# Patient Record
Sex: FEMALE | Race: BLACK OR AFRICAN AMERICAN | NOT HISPANIC OR LATINO | Employment: FULL TIME | RURAL
[De-identification: names, ages, dates, MRNs, and addresses within clinical notes are randomized per-mention and may not be internally consistent; named-entity substitution may affect disease eponyms.]

---

## 2018-10-08 ENCOUNTER — APPOINTMENT (OUTPATIENT)
Dept: LAB | Facility: HOSPITAL | Age: 38
End: 2018-10-08

## 2018-10-08 ENCOUNTER — OFFICE VISIT (OUTPATIENT)
Dept: OBSTETRICS AND GYNECOLOGY | Facility: CLINIC | Age: 38
End: 2018-10-08

## 2018-10-08 VITALS
SYSTOLIC BLOOD PRESSURE: 153 MMHG | HEIGHT: 65 IN | DIASTOLIC BLOOD PRESSURE: 105 MMHG | BODY MASS INDEX: 25.83 KG/M2 | WEIGHT: 155 LBS | HEART RATE: 104 BPM

## 2018-10-08 DIAGNOSIS — R03.0 DIASTOLIC BLOOD PRESSURE 90 MM HG OR HIGHER: ICD-10-CM

## 2018-10-08 DIAGNOSIS — Z11.3 SCREEN FOR SEXUALLY TRANSMITTED DISEASES: ICD-10-CM

## 2018-10-08 DIAGNOSIS — N94.6 DYSMENORRHEA: Primary | ICD-10-CM

## 2018-10-08 DIAGNOSIS — R00.0 TACHYCARDIA WITH HEART RATE 100-120 BEATS PER MINUTE: ICD-10-CM

## 2018-10-08 DIAGNOSIS — Z12.4 SCREENING FOR CERVICAL CANCER: ICD-10-CM

## 2018-10-08 PROCEDURE — 87491 CHLMYD TRACH DNA AMP PROBE: CPT | Performed by: NURSE PRACTITIONER

## 2018-10-08 PROCEDURE — 80307 DRUG TEST PRSMV CHEM ANLYZR: CPT | Performed by: NURSE PRACTITIONER

## 2018-10-08 PROCEDURE — G0123 SCREEN CERV/VAG THIN LAYER: HCPCS | Performed by: NURSE PRACTITIONER

## 2018-10-08 PROCEDURE — 87591 N.GONORRHOEAE DNA AMP PROB: CPT | Performed by: NURSE PRACTITIONER

## 2018-10-08 PROCEDURE — 99204 OFFICE O/P NEW MOD 45 MIN: CPT | Performed by: NURSE PRACTITIONER

## 2018-10-08 PROCEDURE — 87661 TRICHOMONAS VAGINALIS AMPLIF: CPT | Performed by: NURSE PRACTITIONER

## 2018-10-08 RX ORDER — ACETAMINOPHEN AND CODEINE PHOSPHATE 120; 12 MG/5ML; MG/5ML
1 SOLUTION ORAL DAILY
Qty: 28 TABLET | Refills: 12 | Status: SHIPPED | OUTPATIENT
Start: 2018-10-08 | End: 2019-10-08

## 2018-10-08 RX ORDER — IBUPROFEN 800 MG/1
800 TABLET ORAL EVERY 8 HOURS PRN
Qty: 30 TABLET | Refills: 11 | Status: SHIPPED | OUTPATIENT
Start: 2018-10-08

## 2018-10-08 RX ORDER — VALACYCLOVIR HYDROCHLORIDE 1 G/1
1000 TABLET, FILM COATED ORAL DAILY
Qty: 30 TABLET | Refills: 12 | Status: SHIPPED | OUTPATIENT
Start: 2018-10-08

## 2018-10-08 NOTE — PROGRESS NOTES
"Dajuan Montoya Story is a 38 y.o. female. Here with c/o painful periods.     LMP- ; lasts for 7 days. States that she starts having a lot of pain about 2 days before her period each month. \"I didn't know that my  would make my periods hurt so bad\". After a lot of questioning I discovered that her most recent  was 14 years ago and she has a 13 year old son. Her periods became painful after her son was born and she's never been on any birth control since right after high school. She took Ortho Tricyclen around  without any problems.    Last pap-  normal    States that she has seen other providers since her last pap smear was done but can't remember who or where but knows that she was given an injection for pain and was sent home with the same medication in pill form to help the painful periods; this was maybe 2 years ago.      Menstrual Problem   This is a chronic problem. The current episode started more than 1 year ago. The problem occurs daily. The problem has been unchanged. Associated symptoms include diaphoresis and headaches. Pertinent negatives include no chest pain, fatigue, nausea, neck pain, urinary symptoms, vomiting or weakness. Nothing aggravates the symptoms. She has tried NSAIDs for the symptoms. The treatment provided mild relief.   Hypertension   This is a new problem. The current episode started today. The problem is unchanged. Condition status: First BP reading documented. Associated symptoms include headaches and sweats. Pertinent negatives include no anxiety, blurred vision, chest pain, malaise/fatigue, neck pain, palpitations, peripheral edema or shortness of breath. Associated agents: Denied illicit drug use or other medications. Risk factors for coronary artery disease include family history. Past treatments include nothing.       The following portions of the patient's history were reviewed and updated as appropriate: allergies, current medications, past family " history, past medical history, past social history, past surgical history and problem list.    Review of Systems   Constitutional: Positive for diaphoresis. Negative for fatigue, malaise/fatigue and unexpected weight change.   Eyes: Negative for blurred vision.   Respiratory: Negative for chest tightness and shortness of breath.    Cardiovascular: Negative for chest pain and palpitations.   Gastrointestinal: Negative for nausea and vomiting.   Endocrine: Negative.    Genitourinary: Positive for menstrual problem. Negative for dyspareunia, pelvic pain, vaginal bleeding, vaginal discharge and vaginal pain.   Musculoskeletal: Negative for neck pain.   Skin: Negative for color change.   Neurological: Positive for headaches. Negative for weakness and light-headedness.   Psychiatric/Behavioral: Negative for agitation, dysphoric mood, self-injury, sleep disturbance and suicidal ideas. The patient is nervous/anxious.        Objective   Physical Exam   Constitutional: She is oriented to person, place, and time. She appears well-developed and well-nourished. She appears distressed.   Cardiovascular: Regular rhythm, normal heart sounds and intact distal pulses.  Tachycardia present.    Pulmonary/Chest: Effort normal and breath sounds normal.   Genitourinary: Vagina normal and uterus normal. There is no rash, tenderness, lesion or injury on the right labia. There is no rash, tenderness, lesion or injury on the left labia. Cervix exhibits no motion tenderness, no discharge and no friability. Right adnexum displays no mass, no tenderness and no fullness. Left adnexum displays no mass, no tenderness and no fullness.   Genitourinary Comments: Pap smear obtained   Lymphadenopathy:        Right: No inguinal adenopathy present.        Left: No inguinal adenopathy present.   Neurological: She is alert and oriented to person, place, and time.   Skin: Skin is warm and intact. She is diaphoretic.   Psychiatric: Judgment normal. Her mood  appears anxious. Her affect is not angry, not blunt, not labile and not inappropriate. Her speech is rapid and/or pressured and tangential. Her speech is not delayed and not slurred. She is hyperactive. She is not agitated, not aggressive, not slowed, not withdrawn, not actively hallucinating and not combative. Thought content is paranoid. Thought content is not delusional. She does not exhibit a depressed mood. She expresses no homicidal and no suicidal ideation. She is communicative. She exhibits abnormal remote memory. She exhibits normal recent memory. She is attentive.   Nursing note and vitals reviewed.      Assessment/Plan   Lindsay was seen today for menstrual problem and pelvic pain.    Diagnoses and all orders for this visit:    Dysmenorrhea    Diastolic blood pressure 90 mm Hg or higher  -     Urine Drug Screen - Urine, Clean Catch    Tachycardia with heart rate 100-120 beats per minute  -     Urine Drug Screen - Urine, Clean Catch    Screen for sexually transmitted diseases  -     Chlamydia trachomatis, Neisseria gonorrhoeae, Trichomonas vaginalis, PCR - Urine, Urine, Clean Catch  -     Hepatitis C Antibody  -     HIV-1 & HIV-2 Antibodies  -     RPR    Screening for cervical cancer  -     Liquid-based Pap Smear, Screening    Other orders  -     valACYclovir (VALTREX) 1000 MG tablet; Take 1 tablet by mouth Daily.  -     ibuprofen (ADVIL,MOTRIN) 800 MG tablet; Take 1 tablet by mouth Every 8 (Eight) Hours As Needed for Mild Pain .  -     norethindrone (MICRONOR) 0.35 MG tablet; Take 1 tablet by mouth Daily.     Pap smear obtained since she has not had one in 11 years. All STD screening ordered today; pt has already been diagnosed with Herpes. States that she has an outbreak about every 5 months. Started on Valtrex for suppression. Ibuprofen Rx sent; instructed patient to start taking it 2-3 days before onset of menses to help with cramping. Pt also wants to try a birth control pill again to help with her  periods; however, her BP is way too high to consider a ENIO so we'll try a Progestin-only pill at the onset of her next period. An appointment was scheduled with BAR Stark for tomorrow at 1430 for follow up on her elevated BP reading from today. A UDS was ordered as a precaution as her symptoms suggest possible stimulant involvement. I stressed the importance of keeping this follow up appointment to recheck her BP and advised her to go to the ER immediately if she developed a headache with visual changes, numbness or tingling in the face, neck or arms, trouble speaking or walking.

## 2018-10-10 LAB
AMPHET+METHAMPHET UR QL: NEGATIVE
BARBITURATES UR QL SCN: NEGATIVE
BENZODIAZ UR QL SCN: NEGATIVE
C TRACH RRNA CVX QL NAA+PROBE: NEGATIVE
CANNABINOIDS SERPL QL: POSITIVE
COCAINE UR QL: NEGATIVE
METHADONE UR QL SCN: NEGATIVE
N GONORRHOEA RRNA SPEC QL NAA+PROBE: NEGATIVE
OPIATES UR QL: POSITIVE
OXYCODONE UR QL SCN: NEGATIVE
TRICHOMONAS VAGINALIS PCR: POSITIVE

## 2018-10-17 LAB
GEN CATEG CVX/VAG CYTO-IMP: NORMAL
LAB AP CASE REPORT: NORMAL
LAB AP GYN ADDITIONAL INFORMATION: NORMAL
LAB AP GYN OTHER FINDINGS: NORMAL
PATH INTERP SPEC-IMP: NORMAL
STAT OF ADQ CVX/VAG CYTO-IMP: NORMAL

## 2018-10-17 RX ORDER — METRONIDAZOLE 500 MG/1
500 TABLET ORAL 2 TIMES DAILY
Qty: 14 TABLET | Refills: 0 | Status: SHIPPED | OUTPATIENT
Start: 2018-10-17 | End: 2018-10-24